# Patient Record
Sex: MALE | HISPANIC OR LATINO
[De-identification: names, ages, dates, MRNs, and addresses within clinical notes are randomized per-mention and may not be internally consistent; named-entity substitution may affect disease eponyms.]

---
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
## 2023-08-19 ENCOUNTER — EMERGENCY (EMERGENCY)
Facility: HOSPITAL | Age: 18
LOS: 1 days | Discharge: HOME | End: 2023-08-19
Attending: EMERGENCY MEDICINE | Admitting: EMERGENCY MEDICINE
Payer: MEDICAID

## 2023-08-19 VITALS
SYSTOLIC BLOOD PRESSURE: 118 MMHG | DIASTOLIC BLOOD PRESSURE: 72 MMHG | TEMPERATURE: 98 F | HEART RATE: 63 BPM | RESPIRATION RATE: 18 BRPM | OXYGEN SATURATION: 100 %

## 2023-08-19 DIAGNOSIS — Z98.890 OTHER SPECIFIED POSTPROCEDURAL STATES: Chronic | ICD-10-CM

## 2023-08-19 NOTE — ED PROVIDER NOTE - OBJECTIVE STATEMENT
Patient is being video interpretation complains of left eye irritation worse times a few days.  Patient has had blurry vision that has not changed significantly since 1 year ago after having surgery on his eye at [place].  Patient had an injury to his eye with metal in his left eye and has had abnormal vision ever since that has not changed significantly.  No fevers no pus.  No redness around the patient's eye.  No headache.  No fevers.

## 2023-08-19 NOTE — ED PROVIDER NOTE - NSFOLLOWUPINSTRUCTIONS_ED_ALL_ED_FT
Erythromycin ointment left eye each night before bed for 7 days.  Followup with eye doctor within the next few days.  If worse redness, pus, discharge, or any worse symptoms return to the ER.  No rubbing eyes.

## 2023-08-19 NOTE — ED PROVIDER NOTE - PATIENT PORTAL LINK FT
You can access the FollowMyHealth Patient Portal offered by [facility] by registering at the following website: [email or web address]. By joining [employer]’s FollowMyHealth portal, you will also be able to view your health information using other applications (apps) compatible with our system.

## 2023-08-19 NOTE — ED PROVIDER NOTE - CLINICAL SUMMARY MEDICAL DECISION MAKING FREE TEXT BOX
pt with left eye irritation- with normal exam  gave pt erythromycin ointment from eye cart and number and discharge center referral for opthalmology

## 2023-08-19 NOTE — ED ADULT TRIAGE NOTE - CHIEF COMPLAINT QUOTE
Pt presents to ED ambulatory from home with c/o itching to L eye x 1 day. Pt reports having surgery on that eye approx 1 year ago. Pt denies recent trauma or injury, drainage or redness. Pt presents to ED ambulatory from home with c/o itching to L eye x 1 day. Pt reports having surgery on that eye approx 1 year ago. Pt denies recent trauma or injury, drainage or redness, vision changes, or blurry vision.

## 2023-08-23 PROBLEM — Z78.9 OTHER SPECIFIED HEALTH STATUS: Chronic | Status: ACTIVE | Noted: 2023-08-19

## 2023-08-29 ENCOUNTER — NON-APPOINTMENT (OUTPATIENT)
Age: 18
End: 2023-08-29

## 2023-08-29 ENCOUNTER — APPOINTMENT (OUTPATIENT)
Dept: OPHTHALMOLOGY | Facility: CLINIC | Age: 18
End: 2023-08-29
Payer: SELF-PAY

## 2024-01-17 PROBLEM — Z00.00 ENCOUNTER FOR PREVENTIVE HEALTH EXAMINATION: Status: ACTIVE | Noted: 2024-01-17
